# Patient Record
Sex: MALE | Race: WHITE | NOT HISPANIC OR LATINO | Employment: FULL TIME | URBAN - METROPOLITAN AREA
[De-identification: names, ages, dates, MRNs, and addresses within clinical notes are randomized per-mention and may not be internally consistent; named-entity substitution may affect disease eponyms.]

---

## 2024-03-15 ENCOUNTER — HOSPITAL ENCOUNTER (EMERGENCY)
Facility: HOSPITAL | Age: 39
Discharge: LEFT BEFORE DISCHARGE | End: 2024-03-15
Attending: EMERGENCY MEDICINE | Admitting: EMERGENCY MEDICINE

## 2024-03-15 VITALS
BODY MASS INDEX: 18.96 KG/M2 | HEART RATE: 98 BPM | SYSTOLIC BLOOD PRESSURE: 149 MMHG | DIASTOLIC BLOOD PRESSURE: 87 MMHG | TEMPERATURE: 98.8 F | HEIGHT: 72 IN | RESPIRATION RATE: 27 BRPM | OXYGEN SATURATION: 100 % | WEIGHT: 140 LBS

## 2024-03-15 DIAGNOSIS — R55 NEAR SYNCOPE: ICD-10-CM

## 2024-03-15 PROCEDURE — 93005 ELECTROCARDIOGRAM TRACING: CPT | Performed by: EMERGENCY MEDICINE

## 2024-03-15 PROCEDURE — 99283 EMERGENCY DEPT VISIT LOW MDM: CPT

## 2024-03-15 ASSESSMENT — COLUMBIA-SUICIDE SEVERITY RATING SCALE - C-SSRS
1. IN THE PAST MONTH, HAVE YOU WISHED YOU WERE DEAD OR WISHED YOU COULD GO TO SLEEP AND NOT WAKE UP?: NO
2. HAVE YOU ACTUALLY HAD ANY THOUGHTS OF KILLING YOURSELF IN THE PAST MONTH?: NO
6. HAVE YOU EVER DONE ANYTHING, STARTED TO DO ANYTHING, OR PREPARED TO DO ANYTHING TO END YOUR LIFE?: NO

## 2024-03-15 ASSESSMENT — ACTIVITIES OF DAILY LIVING (ADL): ADLS_ACUITY_SCORE: 35

## 2024-03-15 NOTE — ED NOTES
Pt verbalized for the last 2 years he has been drinking regularly every week and everyday about 1/3 of [place] .  Denies  being a smoker

## 2024-03-15 NOTE — ED TRIAGE NOTES
Pt arrived via EMS .  Pt is a  and was driving when he suddenly felt lightheaded, feeling anxious.  Pulled over by [facility] where they call 911 for pt.  Denies any pain or SOB.  No meds taken.  Pt still feels lightheaded and no adequate food since this AM.  Pt is an everyday alcohol drinker.  Last drink was a 2000 last night

## 2024-03-15 NOTE — ED NOTES
Bed: JNFT16  Expected date:   Expected time:   Means of arrival: Ambulance  Comments:  Yanira  CP/ SOB

## 2024-03-15 NOTE — ED PROVIDER NOTES
EMERGENCY DEPARTMENT ENCOUNTER      NAME: Roscoe Bruno  AGE: 38 year old male  YOB: 1985  MRN: [id number]  EVALUATION DATE & TIME: 3/15/2024  2:45 PM    PCP: No primary care provider on file.    ED PROVIDER: Karon Laguna M.D.      Chief Complaint   Patient presents with    Dizziness       FINAL IMPRESSION:  1. Near syncope        ED COURSE & MEDICAL DECISION MAKING:    Near syncope.  Differentials considered are broad including hypoglycemia, alcohol withdrawal, electrolyte disturbance, dehydration, cardiac arrhythmia, PE, ACS, infection, vasovagal.  Patient's EKG reviewed and stable. Normal Qtc, sinus rhythm.  Patient refused labs and further imaging/ testing. He has decision making capacity, does not appear intoxicated, and understands the potential for recurrent symptoms and/or decompensation if he leaves without further workup.  Patient signed out AMA, encouraged to return to ED if he changes his mind.    3:31 PM I met with the patient to gather history and to perform my initial exam. I discussed the plan for care while in the Emergency Department.     Pertinent Labs & Imaging studies reviewed. (See chart for details).    Medical Decision Making  Obtained supplemental history:Supplemental history obtained?: Documented in chart  Reviewed external records: External records reviewed?: Documented in chart and Other: recert [employer] occupational health visit chart from 8/8/19 reviewed.  Care impacted by chronic illness:N/A  Care significantly affected by social determinants of health:Alcohol Abuse and/or Recreational Drug Use  Did you consider but not order tests?: In addition to work-up documented, I considered the following work up:  blood work, ct head, IUA, possible admission.  Did you interpret images independently?: Independent interpretation of ECG and images noted in documentation, when applicable.  Consultation discussion with other provider:Did you involve another provider (consultant, ,  pharmacy, etc.)?: No  Left AMA    At the conclusion of the encounter I discussed the results of all of the tests and the disposition. The questions were answered. The patient or family acknowledged understanding and was agreeable with the care plan.      CRITICAL CARE:  N/A    HPI    Patient information was obtained from: patient     Use of : N/A        Roscoe Bruno is a 38 year old male who presents lightheadedness.    Patient reports that he is a  and started feeling lightheaded while driving today. He felt like he was having and anxiety attack and had to pull over, feeling like he was going to pass out. The episode lasted about 45 minutes. He still feels some lightheadedness but mostly normal. Patient doesn't typically eat breakfast. He is an alcoholic and drinks around 1/3 to 1/2 of a bottle daily. His last drink was 8 PM last night. Patient is hoping to get discharged quickly so he can finish his drive and let his dog out. This has never happened to him before.  Patient denies any dizziness, nausea, headache, vision change, ear ringing, cough, fever, sore throat, daily drug use, history of IV drug use, or history of withdrawals.      REVIEW OF SYSTEMS  All other systems negative unless noted in HPI.    PAST MEDICAL HISTORY:  No past medical history on file.    PAST SURGICAL HISTORY:  No past surgical history on file.      CURRENT MEDICATIONS:    No current facility-administered medications for this encounter.     No current outpatient medications on file.         ALLERGIES:  No Known Allergies    FAMILY HISTORY:  No family history on file.    SOCIAL HISTORY:       VITALS:  No data found.      PHYSICAL EXAM    VITAL SIGNS: BP (!) 149/87   Pulse 98   Temp 98.8  F (37.1  C) (Oral)   Resp 27   Ht 1.829 m (6')   Wt 63.5 kg (140 lb)   SpO2 100%   BMI 18.99 kg/m    Physical Exam  Vitals and nursing note reviewed.   Constitutional:       Appearance: He is not toxic-appearing.       Comments: Slightly disheveled, thin   Eyes:      General: No scleral icterus.        Right eye: No discharge.         Left eye: No discharge.   Cardiovascular:      Rate and Rhythm: Normal rate and regular rhythm.   Pulmonary:      Effort: Pulmonary effort is normal. No respiratory distress.      Breath sounds: Normal breath sounds.   Abdominal:      General: There is no distension.      Palpations: Abdomen is soft.      Tenderness: There is no abdominal tenderness.   Musculoskeletal:         General: No swelling or deformity.      Cervical back: Neck supple. No rigidity.   Skin:     General: Skin is warm and dry.      Capillary Refill: Capillary refill takes less than 2 seconds.   Neurological:      General: No focal deficit present.      Mental Status: He is alert and oriented to person, place, and time. Mental status is at baseline.      Comments: No slurred speech, following commands spontaneously. No facial droop.   Psychiatric:         Mood and Affect: Mood normal.         Behavior: Behavior normal.       LABS  Labs Ordered and Resulted from Time of ED Arrival to Time of ED Departure - No data to display      RADIOLOGY  No orders to display      NA      EKG:    EKG obtained at 15:16 and shows sinus rhythm rate 90, Qtc 450, compared to previous EKG on NA, none available. No PASTORA or depression. . I have independently reviewed and interpreted today's EKG, pending Cardiologist read.       PROCEDURES:  N/A      MEDICATIONS GIVEN IN THE EMERGENCY:  Medications - No data to display    NEW PRESCRIPTIONS STARTED AT TODAY'S ER VISIT  There are no discharge medications for this patient.       I, Radha Sommer, am serving as a scribe to document services personally performed by Karon Laguna MD, based on my observations and the provider's statements to me.  I, Karon Laguna MD, attest that Radha Sommer is acting in a scribe capacity, has observed my performance of the services and has documented them in accordance with my  direction.     Karon Laguna MD  Emergency Medicine  [facility] EMERGENCY DEPARTMENT  [facility] [facility] [id number] [phone]  Dept: [phone]             Karon Laguna MD  03/27/24 1205

## 2024-03-18 LAB
ATRIAL RATE - MUSE: 90 BPM
DIASTOLIC BLOOD PRESSURE - MUSE: NORMAL MMHG
INTERPRETATION ECG - MUSE: NORMAL
P AXIS - MUSE: 65 DEGREES
PR INTERVAL - MUSE: 132 MS
QRS DURATION - MUSE: 92 MS
QT - MUSE: 368 MS
QTC - MUSE: 450 MS
R AXIS - MUSE: 78 DEGREES
SYSTOLIC BLOOD PRESSURE - MUSE: NORMAL MMHG
T AXIS - MUSE: 67 DEGREES
VENTRICULAR RATE- MUSE: 90 BPM